# Patient Record
(demographics unavailable — no encounter records)

---

## 2024-11-27 NOTE — DEVELOPMENTAL MILESTONES
[Normal Development] : Normal Development [None] : none [Begins to turn when name called] : begins to turn when name called [Babbles] : babbles [Rolls over prone to supine] : rolls over prone to supine [Sits briefly without support] : sits briefly without support [Reaches for object and transfers] : reaches for object and transfers [Rakes small object with 4 fingers] : rakes small object with 4 fingers [[place]gett small object on surface] : bangs small object on surface [Passed] : passed [FreeTextEntry2] : 1

## 2024-11-27 NOTE — DISCUSSION/SUMMARY
[Normal Growth] : growth [Normal Development] : development [None] : No medical problems [No Elimination Concerns] : elimination [No Feeding Concerns] : feeding [No Skin Concerns] : skin [Normal Sleep Pattern] : sleep [Term Infant] : Term infant [] : The components of the vaccine(s) to be administered today are listed in the plan of care. The disease(s) for which the vaccine(s) are intended to prevent and the risks have been discussed with the caretaker.  The risks are also included in the appropriate vaccination information statements which have been provided to the patient's caregiver.  The caregiver has given consent to vaccinate. [FreeTextEntry1] : 6mo ex FT presenting for c. Growing well and meeting developmental milestones, no acute concerns from parents.  1.) Health maintenance - Continue current feeding regimen and to continue to introduce purees as tolerated - 6mo vaccines today - Vaxelis ([id number]-FLF-Hep B), PCV, Rotavirus - Declined flu vaccine - RTO in 3 months for [facility] - anticipatory guidance given as follows: Recommend breastfeeding, 8-12 feedings per day. If formula is needed, 2-4 oz every 3-4 hrs. Introduce single-ingredient foods rich in iron, one at a time. Incorporate up to 4 oz of flourinated water daily in a sippy cup. When teeth erupt wipe daily with washcloth. When in car, patient should be in rear-facing car seat in back seat. Put baby to sleep on back, in own crib with no loose or soft bedding. Lower crib matress. Help baby to maintain sleep and feeding routines. May offer pacifier if needed. Continue tummy time when awake. Ensure home is safe since baby is now more mobile. Do not use infant walker. Read aloud to baby.  2.) Eczema - continue liberal emollient use 3-4x per day, limit baths.

## 2024-11-27 NOTE — PHYSICAL EXAM
[Alert] : alert [Normocephalic] : normocephalic [Flat Open Anterior [place]] : flat open anterior fontanelle [Red Reflex] : red reflex bilateral [PERRL] : PERRL [Normally Placed Ears] : normally placed ears [Auricles Well Formed] : auricles well formed [Clear Tympanic membranes] : clear tympanic membranes [Light reflex present] : light reflex present [Bony landmarks visible] : bony landmarks visible [Nares Patent] : nares patent [Palate Intact] : palate intact [Uvula Midline] : uvula midline [Supple, full passive range of motion] : supple, full passive range of motion [Symmetric Chest Rise] : symmetric chest rise [Clear to Auscultation Bilaterally] : clear to auscultation bilaterally [Regular Rate and Rhythm] : regular rate and rhythm [S1, S2 present] : S1, S2 present [+2 Femoral Pulses] : (+) 2 femoral pulses [Soft] : soft [Bowel Sounds] : bowel sounds present [Normal External Genitalia] : normal external genitalia [Normal Vaginal Introitus] : normal vaginal introitus [Patent] : patent [Normally Placed] : normally placed [No Abnormal Lymph Nodes Palpated] : no abnormal lymph nodes palpated [Symmetric Buttocks Creases] : symmetric buttocks creases [Plantar Grasp] : plantar grasp reflex present [Cranial Nerves Grossly Intact] : cranial nerves grossly intact [Acute Distress] : no acute distress [Discharge] : no discharge [Tooth Eruption] : no tooth eruption [Palpable Masses] : no palpable masses [Murmurs] : no murmurs [Tender] : nontender [Distended] : nondistended [Hepatomegaly] : no hepatomegaly [Splenomegaly] : no splenomegaly [Clitoromegaly] : no clitoromegaly [Gilbert-Ortolani] : negative Gilbert-Ortolani [Allis Sign] : negative Allis sign [Spinal Dimple] : no spinal dimple [Tuft of Hair] : no tuft of hair [de-identified] : +dry skin in neck and back without erythema

## 2024-11-27 NOTE — HISTORY OF PRESENT ILLNESS
[Normal] : Normal [In Bassinet/Crib] : sleeps in bassinet/crib [On back] : sleeps on back [Tummy time] : tummy time [No] : No cigarette smoke exposure [de-identified] : 7oz formula 5x per day + fruit/vegetable purees, sips of water

## 2025-02-20 NOTE — HISTORY OF PRESENT ILLNESS
[Father] : father [Formula ___ oz in 24 hrs] : [unfilled] oz of formula in 24 hours [Fruit] : fruit [Vegetables] : vegetables [Cereal] : cereal [Meat] : meat [Eggs] : eggs [Dairy] : dairy [Baby food] : baby food [Finger foods] : finger foods [Water] : water [Normal] : Normal [___ voids per day] : [unfilled] voids per day [Frequency of stools: ___] : Frequency of stools: [unfilled]  stools [per day] : per day. [In Crib] : sleeps in crib [On back] : sleeps on back [Sleeps 12-16 hours per 24 hours (including naps)] : sleeps 12-16 hours per 24 hours (including naps) [Brushing teeth] : brushing teeth [Toothpaste] : Primary Fluoride Source: Toothpaste [No] : Not at  exposure [Water heater temperature set at <120 degrees F] : Water heater temperature set at <120 degrees F [Rear facing car seat in  back seat] : Rear facing car seat in  back seat [Carbon Monoxide Detectors] : Carbon monoxide detectors [Smoke Detectors] : Smoke detectors [Up to date] : Up to date [NO] : No [Vitamin ___] : no vitamins [Fish] : no fish [Peanut] : no peanut [Co-sleeping] : no co-sleeping [Wakes up at night] : does not wake up at night [Loose bedding, pillow, toys, and/or bumpers in crib] : no loose bedding, pillow, toys, and/or bumpers in crib [Pacifier use] : not using pacifier [Sippy Cup use] : not using sippy cup [Bottle in bed] : not using bottle in bed [Screen time only for video chatting] : screen time not just for video chatting [FreeTextEntry7] : URI 2-3 weeks ago, now recovered [de-identified] : Sleep concerns, skin concerns re: eczema [de-identified] : variable

## 2025-02-20 NOTE — PHYSICAL EXAM
[Alert] : alert [Normocephalic] : normocephalic [Flat Open Anterior [place]] : flat open anterior fontanelle [Red Reflex] : red reflex bilateral [PERRL] : PERRL [Normally Placed Ears] : normally placed ears [Auricles Well Formed] : auricles well formed [Clear Tympanic membranes] : clear tympanic membranes [Light reflex present] : light reflex present [Bony landmarks visible] : bony landmarks visible [Patent Auditory Canals] : patent auditory canals [Nares Patent] : nares patent [Palate Intact] : palate intact [Uvula Midline] : uvula midline [Tooth Eruption] : tooth eruption [Supple, full passive range of motion] : supple, full passive range of motion [Symmetric Chest Rise] : symmetric chest rise [Clear to Auscultation Bilaterally] : clear to auscultation bilaterally [Regular Rate and Rhythm] : regular rate and rhythm [S1, S2 present] : S1, S2 present [+2 Femoral Pulses] : (+) 2 femoral pulses [Soft] : soft [Bowel Sounds] : bowel sounds present [Normal External Genitalia] : normal external genitalia [Normal Vaginal Introitus] : normal vaginal introitus [No Abnormal Lymph Nodes Palpated] : no abnormal lymph nodes palpated [Symmetric abduction and rotation of hips] : symmetric abduction and rotation of hips [Straight] : straight [Cranial Nerves Grossly Intact] : cranial nerves grossly intact [Acute Distress] : no acute distress [Excessive Tearing] : no excessive tearing [Discharge] : no discharge [Erythematous Oropharynx] : nonerythematous oropharynx [Palpable Masses] : no palpable masses [Murmurs] : no murmurs [Tender] : nontender [Distended] : nondistended [Hepatomegaly] : no hepatomegaly [Splenomegaly] : no splenomegaly [Clitoromegaly] : no clitoromegaly [Allis Sign] : negative Allis sign [Rash or Lesions] : no rash/lesions [de-identified] : Mild hyperpigmented post inflammatory reaction on neck and chest.

## 2025-02-20 NOTE — DISCUSSION/SUMMARY
[Normal Growth] : growth [Normal Development] : development [None] : No known medical problems [No Elimination Concerns] : elimination [No Feeding Concerns] : feeding [No Skin Concerns] : skin [Normal Sleep Pattern] : sleep [Family Adaptation] : family adaptation [Infant [place]] : infant independence [Feeding Routine] : feeding routine [Safety] : safety [No Medications] : ~He/She~ is not on any medications [Parent/Guardian] : parent/guardian [FreeTextEntry1] : Jhon is a 9 month F with history mild eczema, presenting for 9m [facility].  Interval history of URI 2-3 weeks ago, now resolved.  Parental concerns include sleep pattern, stooling, dry skin, introduction of peanut products.  Sleep pattern described as patient having fussiness about 50% of nights. However patient sleeping through night after falls asleep. Recommend no screen time or active play prior to bed time, dimming lights, reading to patient, and removing anything too stimulating from bed time routine. Emphasized importance of routine.  Concern re: stooling pattern and stool consistency changing, reassured that stool pattern normal and expected at this age as diet changes from liquid to solid. Discussed that if goes 5+ days, stool is hard and small pebble shaped, or there is pain with stooling or blood in stool to call and inform PMD.  Intermittent outbreaks of eczema, no pictures but appears mild per description. Recommend fragrance free moisturizers (Aveeno, Eucerin, Cerave), changing detergent to Tide free and gentle or All free and clear, and apply Aquaphor liberally.  Patient in low risk category for peanut allergy, encouraged introduction of peanut product at home.  Declined Flu at this visit.   1. Health Maintenance - Pb and CBC today - RTO in 3 months for 12m [facility] - SWYC passed  2. Eczema Take short baths (less than 5 minutes) and avoid hot water, 2-3x/week, use soap right at the end, minimal amount - only on areas where truly needed. Use gentle cleanser such as Aveeno fragrance free. No bubble baths. Child may play in plain bath water for 2-3 minutes, then use soap/shampoo, and take child out of bath immediately. Do not let child sit in sudsy bath water. Pat dry only after coming out of bath/shower. Moisturize right after bath time with plain fragrance-free emollient such as plain Vaseline or CeraVe. Reapply moisturizer frequently throughout the day, at least 3-4x/day. Apply plain Vaseline to face prior to mealtimes. Use detergent such as All Free and Clear or Tide Free and Gentle. Do not use fabric softener. Recommended taking pictures at next break out to show us.  Continue breastmilk or formula as desired. Increase table foods, 3 meals with 2-3 snacks per day. Incorporate up to 6 oz of flourinated water daily in a sippy cup. Discussed weaning of bottle and pacifier. Wipe teeth daily with washcloth. When in car, patient should be in rear-facing car seat in back seat. Put baby to sleep in own crib with no loose or soft bedding. Lower crib matress. Help baby to maintain consistent daily routines and sleep schedule. Recognize stranger anxiety. Ensure home is safe since baby is increasingly mobile. Be within arm's reach of baby at all times. Use consistent, positive discipline. Avoid screen time. Read aloud to baby.

## 2025-02-20 NOTE — REVIEW OF SYSTEMS
[Dry Skin] : dry skin [Negative] : Endocrine [Jaundice] : no jaundice [Rash] : no rash [Itching] : no itching [Birthmarks] : no birthmarks [Seborrhea] : no seborrhea

## 2025-02-20 NOTE — DEVELOPMENTAL MILESTONES
[Normal Development] : Normal Development [None] : none [Uses basic gestures] : uses basic gestures [Says "Chidi" or "Mama"] : says "Chidi" or "Mama" nonspecifically [Sits well without support] : sits well without support [Transitions between sitting and lying] : transitions between sitting and lying [Balances on hands and knees] : balances on hands and knees [Crawls] : crawls [Picks up small objects with 3 fingers] : picks up small objects with 3 fingers and thumb [Releases objects intentionally] : releases objects intentionally [[place] objects together] : bangs objects together [Yes] : Completed. [FreeTextEntry1] : Survey of Wellbeing of Young Children (SWYC) at 9 months Developmental Milestones 17 (<8 refer to Early Intervention, 9-11 below average, 12-15 average, 16+ advanced) at 9 months. Baby Pediatric Symptom Checklist 0 on inflexibility, 1 on irritability, 1 on routines (normal <3 on any domain)

## 2025-05-26 NOTE — PHYSICAL EXAM
[Alert] : alert [Acute Distress] : acute distress [Playful] : playful [Normocephalic] : normocephalic [Closed Anterior [place]] : closed anterior fontanelle [Red Reflex] : red reflex bilateral [PERRL] : PERRL [EOMI Bilateral] : EOMI bilateral [Normally Placed Ears] : normally placed ears [Auricles Well Formed] : auricles well formed [Clear Tympanic membranes] : clear tympanic membranes [Light reflex present] : light reflex present [Nares Patent] : nares patent [Palate Intact] : palate intact [Uvula Midline] : uvula midline [Tooth Eruption] : tooth eruption [Supple, full passive range of motion] : supple, full passive range of motion [Symmetric Chest Rise] : symmetric chest rise [Clear to Auscultation Bilaterally] : clear to auscultation bilaterally [Regular Rate and Rhythm] : regular rate and rhythm [S1, S2 present] : S1, S2 present [+2 Femoral Pulses] : (+) 2 femoral pulses [Soft] : soft [Bowel Sounds] : normoactive bowel sounds [Normal External Genitalia] : normal external genitalia [Normal Vaginal Introitus] : normal vaginal introitus [No Abnormal Lymph Nodes Palpated] : no abnormal lymph nodes palpated [Symmetric Abduction and Rotation of Hips] : symmetric abduction and rotation of hips [Straight] : straight [Cranial Nerves Grossly Intact] : cranial nerves grossly intact [Discharge] : no discharge [Palpable Masses] : no palpable masses [Murmurs] : no murmurs [Tender] : nontender [Distended] : nondistended [Hepatomegaly] : no hepatomegaly [Splenomegaly] : no splenomegaly [Clitoromegaly] : no clitoromegaly [Allis Sign] : negative Allis sign [de-identified] : Mild hypopigmentation on back and bilateral cheeks, and mild erythematous patches on chin.

## 2025-05-26 NOTE — PHYSICAL EXAM
[Alert] : alert [Acute Distress] : acute distress [Playful] : playful [Normocephalic] : normocephalic [Closed Anterior [place]] : closed anterior fontanelle [Red Reflex] : red reflex bilateral [PERRL] : PERRL [EOMI Bilateral] : EOMI bilateral [Normally Placed Ears] : normally placed ears [Auricles Well Formed] : auricles well formed [Clear Tympanic membranes] : clear tympanic membranes [Light reflex present] : light reflex present [Nares Patent] : nares patent [Palate Intact] : palate intact [Uvula Midline] : uvula midline [Tooth Eruption] : tooth eruption [Supple, full passive range of motion] : supple, full passive range of motion [Symmetric Chest Rise] : symmetric chest rise [Clear to Auscultation Bilaterally] : clear to auscultation bilaterally [Regular Rate and Rhythm] : regular rate and rhythm [S1, S2 present] : S1, S2 present [+2 Femoral Pulses] : (+) 2 femoral pulses [Soft] : soft [Bowel Sounds] : normoactive bowel sounds [Normal External Genitalia] : normal external genitalia [Normal Vaginal Introitus] : normal vaginal introitus [No Abnormal Lymph Nodes Palpated] : no abnormal lymph nodes palpated [Symmetric Abduction and Rotation of Hips] : symmetric abduction and rotation of hips [Straight] : straight [Cranial Nerves Grossly Intact] : cranial nerves grossly intact [Discharge] : no discharge [Palpable Masses] : no palpable masses [Murmurs] : no murmurs [Tender] : nontender [Distended] : nondistended [Hepatomegaly] : no hepatomegaly [Splenomegaly] : no splenomegaly [Clitoromegaly] : no clitoromegaly [Allis Sign] : negative Allis sign [de-identified] : Mild hypopigmentation on back and bilateral cheeks, and mild erythematous patches on chin.

## 2025-05-26 NOTE — PHYSICAL EXAM
[Alert] : alert [Acute Distress] : acute distress [Playful] : playful [Normocephalic] : normocephalic [Closed Anterior [place]] : closed anterior fontanelle [Red Reflex] : red reflex bilateral [PERRL] : PERRL [EOMI Bilateral] : EOMI bilateral [Normally Placed Ears] : normally placed ears [Auricles Well Formed] : auricles well formed [Clear Tympanic membranes] : clear tympanic membranes [Light reflex present] : light reflex present [Nares Patent] : nares patent [Palate Intact] : palate intact [Uvula Midline] : uvula midline [Tooth Eruption] : tooth eruption [Supple, full passive range of motion] : supple, full passive range of motion [Symmetric Chest Rise] : symmetric chest rise [Clear to Auscultation Bilaterally] : clear to auscultation bilaterally [Regular Rate and Rhythm] : regular rate and rhythm [S1, S2 present] : S1, S2 present [+2 Femoral Pulses] : (+) 2 femoral pulses [Soft] : soft [Bowel Sounds] : normoactive bowel sounds [Normal External Genitalia] : normal external genitalia [Normal Vaginal Introitus] : normal vaginal introitus [No Abnormal Lymph Nodes Palpated] : no abnormal lymph nodes palpated [Symmetric Abduction and Rotation of Hips] : symmetric abduction and rotation of hips [Straight] : straight [Cranial Nerves Grossly Intact] : cranial nerves grossly intact [Discharge] : no discharge [Palpable Masses] : no palpable masses [Murmurs] : no murmurs [Tender] : nontender [Distended] : nondistended [Hepatomegaly] : no hepatomegaly [Splenomegaly] : no splenomegaly [Clitoromegaly] : no clitoromegaly [Allis Sign] : negative Allis sign [de-identified] : Mild hypopigmentation on back and bilateral cheeks, and mild erythematous patches on chin.

## 2025-05-26 NOTE — HISTORY OF PRESENT ILLNESS
[Father] : father [Fruit] : fruit [Vegetables] : vegetables [Meat] : meat [Dairy] : dairy [Baby food] : baby food [Finger food] : finger food [Table food] : table food [Normal] : Normal [___ stools per day] : [unfilled]  stools per day [___ voids per day] : [unfilled] voids per day [Wakes up at night] : Wakes up at night [Sippy cup use] : Sippy cup use [Brushing teeth] : Brushing teeth [Bottle in bed] : Bottle in bed [Toothpaste] : Primary Fluoride Source: Toothpaste [No] : No cigarette smoke exposure [Water heater temperature set at <120 degrees F] : Water heater temperature set at <120 degrees F [Car seat in back seat] : Car seat in back seat [Smoke Detectors] : Smoke detectors [Carbon Monoxide Detectors] : Carbon monoxide detectors [NO] : No [___ Feeding per 24 hrs] : a  total of [unfilled] feedings in 24 hours [Formula ___ oz/feed] : [unfilled] oz of formula per feed [Exposure to electronic nicotine delivery system] : No exposure to electronic nicotine delivery system [FreeTextEntry7] : Jhon is a 12mo F w/ hx of eczema presenting for her 1y WCC. Doing well, no concerns. [de-identified] : Discussed transitioning to Cows milk.

## 2025-05-26 NOTE — HISTORY OF PRESENT ILLNESS
[Father] : father [Fruit] : fruit [Vegetables] : vegetables [Meat] : meat [Dairy] : dairy [Baby food] : baby food [Finger food] : finger food [Table food] : table food [Normal] : Normal [___ stools per day] : [unfilled]  stools per day [___ voids per day] : [unfilled] voids per day [Wakes up at night] : Wakes up at night [Sippy cup use] : Sippy cup use [Brushing teeth] : Brushing teeth [Bottle in bed] : Bottle in bed [Toothpaste] : Primary Fluoride Source: Toothpaste [No] : No cigarette smoke exposure [Water heater temperature set at <120 degrees F] : Water heater temperature set at <120 degrees F [Car seat in back seat] : Car seat in back seat [Smoke Detectors] : Smoke detectors [Carbon Monoxide Detectors] : Carbon monoxide detectors [NO] : No [___ Feeding per 24 hrs] : a  total of [unfilled] feedings in 24 hours [Formula ___ oz/feed] : [unfilled] oz of formula per feed [Exposure to electronic nicotine delivery system] : No exposure to electronic nicotine delivery system [FreeTextEntry7] : Jhon is a 12mo F w/ hx of eczema presenting for her 1y WCC. Doing well, no concerns. [de-identified] : Discussed transitioning to Cows milk.

## 2025-05-26 NOTE — DISCUSSION/SUMMARY
[Family Support] : family support [Establishing Routines] : establishing routines [Feeding and Appetite Changes] : feeding and appetite changes [Establishing A Dental Home] : establishing a dental home [Safety] : safety [FreeTextEntry1] : Jhon is a 12mo F presenting for her well child check. Overall pt is doing well, no concerns. No further issues with constipation, does have some eczema and hypopigmentation. Discussed managing with emollients but if flares/worsens can call for evaluation or topical steroids. Otherwise, growing well.  #Health maintenance - [place] screen passed - Vaccines administered today: MMRV, VZV, HepA, PCV20 - Anticipatory guidance provided: Transition to whole cow's milk. Continue table foods, 3 meals with 2-3 snacks per day. Incorporate up to 6 oz of fluorinated water daily in a sippy cup. Brush teeth twice a day with soft toothbrush. Recommend visit to dentist. When in car, keep child in rear-facing car seats until age 2, or until  the maximum height and weight for seat is reached. Put baby to sleep in own crib with no loose or soft bedding. Lower crib mattress. Help baby to maintain consistent daily routines and sleep schedule. Recognize stranger and separation anxiety. Ensure home is safe since baby is increasingly mobile. Be within arm's reach of baby at all times. Use consistent, positive discipline. Avoid screen time. Read aloud to baby.  #Mild Eczema - Apply topical emollients  F/u in 3mo for 15mo WCC

## 2025-05-26 NOTE — HISTORY OF PRESENT ILLNESS
[Father] : father [Fruit] : fruit [Vegetables] : vegetables [Meat] : meat [Dairy] : dairy [Baby food] : baby food [Finger food] : finger food [Table food] : table food [Normal] : Normal [___ stools per day] : [unfilled]  stools per day [___ voids per day] : [unfilled] voids per day [Wakes up at night] : Wakes up at night [Sippy cup use] : Sippy cup use [Brushing teeth] : Brushing teeth [Bottle in bed] : Bottle in bed [Toothpaste] : Primary Fluoride Source: Toothpaste [No] : No cigarette smoke exposure [Water heater temperature set at <120 degrees F] : Water heater temperature set at <120 degrees F [Car seat in back seat] : Car seat in back seat [Smoke Detectors] : Smoke detectors [Carbon Monoxide Detectors] : Carbon monoxide detectors [NO] : No [___ Feeding per 24 hrs] : a  total of [unfilled] feedings in 24 hours [Formula ___ oz/feed] : [unfilled] oz of formula per feed [Exposure to electronic nicotine delivery system] : No exposure to electronic nicotine delivery system [FreeTextEntry7] : Jhon is a 12mo F w/ hx of eczema presenting for her 1y WCC. Doing well, no concerns. [de-identified] : Discussed transitioning to Cows milk.

## 2025-07-15 NOTE — ASSESSMENT
[Use of independent historian: [ enter independent historian's relationship to patient ] :____] : As the patient was unable to provide a complete and reliable history, I obtained clinical history from the patient's [unfilled] [FreeTextEntry1] : #Atopic dermatitis, chronic condition, flaring  - Discussed diagnosis, natural course and treatment plan for disease flares and maintenance strategies to prevent future flares  - Dry skin care reviewed including use of fragrance-free products and liberal OTC emollient use BID. Avoid hot showers.  - START  Triamcinolone 0.1% ointment BID to AA 2 weeks on / 1 week off PRN flare. Avoid face, groin, axilla.  SED including atrophy, dyspigmentation, telangiectasias, striae. Proper use reviewed including only using to affected area and avoidance of prolonged use.  - START hydrocortisone 2.5% once daily to the face, for up to 2 weeks  - STOP A&D  #Xerosis Cutis - Gentle skin care reviewed. Short baths/showers every other day, moisturize within three minutes of getting out of the shower, lukewarm water, liberal use of emollients at least 2-3X/day Cetaphil or Cerave cream BID to TID. Dove non scented soap in showers.  - Provided hand out on dry skin care and recommended gentle moisturizing creams  - Recommended limiting baths and showers to 5min and using warm not hot water, patting dry with towel without rubbing, and moisturizing immediately afterwards  RTC 3 months

## 2025-07-15 NOTE — CONSULT LETTER
[Dear  ___] : Dear  [unfilled], [Consult Letter:] : I had the pleasure of evaluating your patient, [unfilled]. [Please see my note below.] : Please see my note below. [Consult Closing:] : Thank you very much for allowing me to participate in the care of this patient.  If you have any questions, please do not hesitate to contact me. [Sincerely,] : Sincerely, [FreeTextEntry3] : Peewee Almodovar MD [facility] [place], [place]

## 2025-07-15 NOTE — HISTORY OF PRESENT ILLNESS
[FreeTextEntry1] : NPV - rash [de-identified] : 14 month old F here with her mom presenting for evaluation of a rash. Present for 1 month. Very itchy and diffuse on her body. No new exposures. No known allergies or asthma. Otherwise feeling well. Has been using A&D ointment and hydrocortisone 1% without relief.